# Patient Record
Sex: MALE | Race: OTHER | HISPANIC OR LATINO | Employment: OTHER | URBAN - METROPOLITAN AREA
[De-identification: names, ages, dates, MRNs, and addresses within clinical notes are randomized per-mention and may not be internally consistent; named-entity substitution may affect disease eponyms.]

---

## 2021-03-15 ENCOUNTER — IMMUNIZATIONS (OUTPATIENT)
Dept: FAMILY MEDICINE CLINIC | Facility: HOSPITAL | Age: 81
End: 2021-03-15

## 2021-03-15 DIAGNOSIS — Z23 ENCOUNTER FOR IMMUNIZATION: Primary | ICD-10-CM

## 2021-03-15 PROCEDURE — 91300 SARS-COV-2 / COVID-19 MRNA VACCINE (PFIZER-BIONTECH) 30 MCG: CPT

## 2021-03-15 PROCEDURE — 0001A SARS-COV-2 / COVID-19 MRNA VACCINE (PFIZER-BIONTECH) 30 MCG: CPT

## 2021-03-19 ENCOUNTER — HOSPITAL ENCOUNTER (EMERGENCY)
Facility: HOSPITAL | Age: 81
Discharge: HOME | End: 2021-03-19
Attending: EMERGENCY MEDICINE
Payer: COMMERCIAL

## 2021-03-19 VITALS
RESPIRATION RATE: 20 BRPM | WEIGHT: 130 LBS | HEART RATE: 60 BPM | HEIGHT: 68 IN | TEMPERATURE: 97.7 F | DIASTOLIC BLOOD PRESSURE: 65 MMHG | SYSTOLIC BLOOD PRESSURE: 170 MMHG | BODY MASS INDEX: 19.7 KG/M2 | OXYGEN SATURATION: 99 %

## 2021-03-19 DIAGNOSIS — R68.89 CHANGE IN BLOOD PRESSURE: Primary | ICD-10-CM

## 2021-03-19 LAB
ALBUMIN SERPL BCP-MCNC: 3.7 G/DL (ref 3.5–5)
ALP SERPL-CCNC: 120 U/L (ref 46–116)
ALT SERPL W P-5'-P-CCNC: 38 U/L (ref 12–78)
ANION GAP SERPL CALCULATED.3IONS-SCNC: 8 MMOL/L (ref 4–13)
AST SERPL W P-5'-P-CCNC: 25 U/L (ref 5–45)
ATRIAL RATE: 59 BPM
BASOPHILS # BLD AUTO: 0.03 THOUSANDS/ΜL (ref 0–0.1)
BASOPHILS NFR BLD AUTO: 1 % (ref 0–1)
BILIRUB SERPL-MCNC: 0.28 MG/DL (ref 0.2–1)
BUN SERPL-MCNC: 26 MG/DL (ref 5–25)
CALCIUM SERPL-MCNC: 9.2 MG/DL (ref 8.3–10.1)
CHLORIDE SERPL-SCNC: 102 MMOL/L (ref 100–108)
CHOLEST SERPL-MCNC: 211 MG/DL (ref 50–200)
CO2 SERPL-SCNC: 32 MMOL/L (ref 21–32)
CREAT SERPL-MCNC: 0.86 MG/DL (ref 0.6–1.3)
EOSINOPHIL # BLD AUTO: 0.16 THOUSAND/ΜL (ref 0–0.61)
EOSINOPHIL NFR BLD AUTO: 3 % (ref 0–6)
ERYTHROCYTE [DISTWIDTH] IN BLOOD BY AUTOMATED COUNT: 12.6 % (ref 11.6–15.1)
GFR SERPL CREATININE-BSD FRML MDRD: 82 ML/MIN/1.73SQ M
GLUCOSE SERPL-MCNC: 97 MG/DL (ref 65–140)
HCT VFR BLD AUTO: 41.9 % (ref 36.5–49.3)
HDLC SERPL-MCNC: 68 MG/DL
HGB BLD-MCNC: 13.9 G/DL (ref 12–17)
IMM GRANULOCYTES # BLD AUTO: 0.06 THOUSAND/UL (ref 0–0.2)
IMM GRANULOCYTES NFR BLD AUTO: 1 % (ref 0–2)
LDLC SERPL CALC-MCNC: 135 MG/DL (ref 0–100)
LYMPHOCYTES # BLD AUTO: 0.95 THOUSANDS/ΜL (ref 0.6–4.47)
LYMPHOCYTES NFR BLD AUTO: 16 % (ref 14–44)
MCH RBC QN AUTO: 31.8 PG (ref 26.8–34.3)
MCHC RBC AUTO-ENTMCNC: 33.2 G/DL (ref 31.4–37.4)
MCV RBC AUTO: 96 FL (ref 82–98)
MONOCYTES # BLD AUTO: 0.47 THOUSAND/ΜL (ref 0.17–1.22)
MONOCYTES NFR BLD AUTO: 8 % (ref 4–12)
NEUTROPHILS # BLD AUTO: 4.45 THOUSANDS/ΜL (ref 1.85–7.62)
NEUTS SEG NFR BLD AUTO: 71 % (ref 43–75)
NONHDLC SERPL-MCNC: 143 MG/DL
NRBC BLD AUTO-RTO: 0 /100 WBCS
P AXIS: 85 DEGREES
PLATELET # BLD AUTO: 157 THOUSANDS/UL (ref 149–390)
PMV BLD AUTO: 10 FL (ref 8.9–12.7)
POTASSIUM SERPL-SCNC: 3.6 MMOL/L (ref 3.5–5.3)
PR INTERVAL: 158 MS
PROT SERPL-MCNC: 7.7 G/DL (ref 6.4–8.2)
QRS AXIS: 84 DEGREES
QRSD INTERVAL: 102 MS
QT INTERVAL: 418 MS
QTC INTERVAL: 413 MS
RBC # BLD AUTO: 4.37 MILLION/UL (ref 3.88–5.62)
SODIUM SERPL-SCNC: 142 MMOL/L (ref 136–145)
T WAVE AXIS: 66 DEGREES
TRIGL SERPL-MCNC: 39 MG/DL
VENTRICULAR RATE: 59 BPM
WBC # BLD AUTO: 6.12 THOUSAND/UL (ref 4.31–10.16)

## 2021-03-19 PROCEDURE — 93005 ELECTROCARDIOGRAM TRACING: CPT

## 2021-03-19 PROCEDURE — 93010 ELECTROCARDIOGRAM REPORT: CPT | Performed by: INTERNAL MEDICINE

## 2021-03-19 PROCEDURE — 85025 COMPLETE CBC W/AUTO DIFF WBC: CPT | Performed by: NURSE PRACTITIONER

## 2021-03-19 PROCEDURE — 80061 LIPID PANEL: CPT | Performed by: NURSE PRACTITIONER

## 2021-03-19 PROCEDURE — 99283 EMERGENCY DEPT VISIT LOW MDM: CPT

## 2021-03-19 PROCEDURE — 99282 EMERGENCY DEPT VISIT SF MDM: CPT | Performed by: NURSE PRACTITIONER

## 2021-03-19 PROCEDURE — 36415 COLL VENOUS BLD VENIPUNCTURE: CPT | Performed by: NURSE PRACTITIONER

## 2021-03-19 PROCEDURE — 80053 COMPREHEN METABOLIC PANEL: CPT | Performed by: NURSE PRACTITIONER

## 2021-03-20 NOTE — ED PROVIDER NOTES
History  Chief Complaint   Patient presents with    Medical Problem     Patient "monitoring blood pressure at home and noticed high blood pressure at home, has no compliants, does not take medication for bp"     This is a 71-year-old male patient brought to the department by his daughter was been checking his blood pressure intermittently for the last several days since he has had his COVID vaccine  She has noticed fluctuations in his blood pressure from blood pressures as low as 468 systolic to as high as 332 systolically  Patient offers no symptoms but the patient's daughter is concerned that he may need blood pressure medication  She made several phone calls and everyone told her to come to the emergency room  He is 79 and is not on any medications  [facility] but he offers no complaints  Medical Problem  Associated symptoms: no abdominal pain, no chest pain, no congestion, no cough, no diarrhea, no ear pain, no fatigue, no fever, no headaches, no rash, no shortness of breath, no sore throat, no vomiting and no wheezing        None       No past medical history on file  Past Surgical History:   Procedure Laterality Date    HIP SURGERY         No family history on file  I have reviewed and agree with the history as documented  E-Cigarette/Vaping    E-Cigarette Use Never User      E-Cigarette/Vaping Substances     Social History     Tobacco Use    Smoking status: Former Smoker   Substance Use Topics    Alcohol use: Never     Frequency: Never    Drug use: Never       Review of Systems   Constitutional: Negative for diaphoresis, fatigue and fever  HENT: Negative for congestion, ear pain, nosebleeds and sore throat  Eyes: Negative for photophobia, pain, discharge and visual disturbance  Respiratory: Negative for cough, choking, chest tightness, shortness of breath and wheezing  Cardiovascular: Negative for chest pain and palpitations  Gastrointestinal: Negative for abdominal distention, abdominal pain, diarrhea and vomiting  Genitourinary: Negative for dysuria, flank pain and frequency  Musculoskeletal: Negative for back pain, gait problem and joint swelling  Skin: Negative for color change and rash  Neurological: Negative for dizziness, syncope and headaches  Psychiatric/Behavioral: Negative for behavioral problems and confusion  The patient is not nervous/anxious  All other systems reviewed and are negative  Physical Exam  Physical Exam  Vitals signs reviewed  Constitutional:       Appearance: He is well-developed  He is not diaphoretic  HENT:      Head: Normocephalic and atraumatic  Eyes:      Pupils: Pupils are equal, round, and reactive to light  Neck:      Musculoskeletal: Normal range of motion and neck supple  Cardiovascular:      Rate and Rhythm: Normal rate and regular rhythm  Chest Wall: PMI is not displaced  Pulses: Normal pulses  Heart sounds: Normal heart sounds  Pulmonary:      Effort: Pulmonary effort is normal  No respiratory distress  Breath sounds: Normal breath sounds  Abdominal:      General: There is no distension  Palpations: Abdomen is soft  Tenderness: There is no guarding  Musculoskeletal: Normal range of motion  Lymphadenopathy:      Cervical: No cervical adenopathy  Skin:     General: Skin is warm and dry  Coloration: Skin is not pale  Findings: No rash  Neurological:      Mental Status: He is alert and oriented to person, place, and time           Vital Signs  ED Triage Vitals   Temperature Pulse Respirations Blood Pressure SpO2   03/19/21 1842 03/19/21 1842 03/19/21 1842 03/19/21 1842 03/19/21 1842   97 7 °F (36 5 °C) 55 18 156/68 99 %      Temp Source Heart Rate Source Patient Position - Orthostatic VS BP Location FiO2 (%)   03/19/21 1842 03/19/21 1842 03/19/21 2131 03/19/21 1842 --   Oral Monitor Sitting Left arm       Pain Score --                  Vitals:    03/19/21 1842 03/19/21 2131   BP: 156/68 170/65   Pulse: 55 60   Patient Position - Orthostatic VS:  Sitting         Visual Acuity      ED Medications  Medications - No data to display    Diagnostic Studies  Results Reviewed     Procedure Component Value Units Date/Time    Lipid panel [[id number]]  (Abnormal) Collected: 03/19/21 2056    Lab Status: Final result Specimen: Blood from Arm, Left Updated: 03/19/21 2115     Cholesterol 211 mg/dL      Triglycerides 39 mg/dL      HDL, Direct 68 mg/dL      LDL Calculated 135 mg/dL      Non-HDL-Chol (CHOL-HDL) 143 mg/dl     Comprehensive metabolic panel [[id number]]  (Abnormal) Collected: 03/19/21 2056    Lab Status: Final result Specimen: Blood from Arm, Left Updated: 03/19/21 2114     Sodium 142 mmol/L      Potassium 3 6 mmol/L      Chloride 102 mmol/L      CO2 32 mmol/L      ANION GAP 8 mmol/L      BUN 26 mg/dL      Creatinine 0 86 mg/dL      Glucose 97 mg/dL      Calcium 9 2 mg/dL      AST 25 U/L      ALT 38 U/L      Alkaline Phosphatase 120 U/L      Total Protein 7 7 g/dL      Albumin 3 7 g/dL      Total Bilirubin 0 28 mg/dL      eGFR 82 ml/min/1 73sq m     Narrative:      [place] guidelines for Chronic Kidney Disease (CKD):     Stage 1 with normal or high GFR (GFR > 90 mL/min/1 73 square meters)    Stage 2 Mild CKD (GFR = 60-89 mL/min/1 73 square meters)    Stage 3A Moderate CKD (GFR = 45-59 mL/min/1 73 square meters)    Stage 3B Moderate CKD (GFR = 30-44 mL/min/1 73 square meters)    Stage 4 Severe CKD (GFR = 15-29 mL/min/1 73 square meters)    Stage 5 End Stage CKD (GFR <15 mL/min/1 73 square meters)  Note: GFR calculation is accurate only with a steady state creatinine    CBC and differential [[id number]] Collected: 03/19/21 2056    Lab Status: Final result Specimen: Blood from Arm, Left Updated: 03/19/21 2108     WBC 6 12 Thousand/uL      RBC 4 37 Million/uL      Hemoglobin 13 9 g/dL      Hematocrit 41 9 %      MCV 96 fL      MCH 31 8 pg      MCHC 33 2 g/dL      RDW 12 6 %      MPV 10 0 fL      Platelets 656 Thousands/uL      nRBC 0 /100 WBCs      Neutrophils Relative 71 %      Immat GRANS % 1 %      Lymphocytes Relative 16 %      Monocytes Relative 8 %      Eosinophils Relative 3 %      Basophils Relative 1 %      Neutrophils Absolute 4 45 Thousands/µL      Immature Grans Absolute 0 06 Thousand/uL      Lymphocytes Absolute 0 95 Thousands/µL      Monocytes Absolute 0 47 Thousand/µL      Eosinophils Absolute 0 16 Thousand/µL      Basophils Absolute 0 03 Thousands/µL                  No orders to display              Procedures  Procedures         ED Course                             SBIRT 20yo+      Most Recent Value   SBIRT (24 yo +)   In order to provide better care to our patients, we are screening all of our patients for alcohol and drug use  Would it be okay to ask you these screening questions? Yes Filed at: 03/19/2021 2132   Initial Alcohol Screen: US AUDIT-C    1  How often do you have a drink containing alcohol?  0 Filed at: 03/19/2021 2132   2  How many drinks containing alcohol do you have on a typical day you are drinking? 0 Filed at: 03/19/2021 2132   3a  Male UNDER 65: How often do you have five or more drinks on one occasion? 0 Filed at: 03/19/2021 2132   3b  FEMALE Any Age, or MALE 65+: How often do you have 4 or more drinks on one occassion? 0 Filed at: 03/19/2021 2132   Audit-C Score  0 Filed at: 03/19/2021 2132   SHAWANDA: How many times in the past year have you    Used an illegal drug or used a prescription medication for non-medical reasons?   Never Filed at: 03/19/2021 2132                    MDM      Disposition  Final diagnoses:   Change in blood pressure     Time reflects when diagnosis was documented in both MDM as applicable and the Disposition within this note     Time User Action Codes Description Comment    3/19/2021  9:18 PM Israel Ro [R68 [id number]] Change in blood pressure ED Disposition     ED Disposition Condition Date/Time Comment    Discharge Stable Fri Mar 19, 2021  9:18 PM Jacinto Teresa discharge to home/self care  Follow-up Information     Follow up With Specialties Details Why Contact Info Additional [address] [facility] Schedule an appointment as soon as possible for a visit  To establish a primary care provider [facility] [phone]  [address], [address] [facility], [place], [place], [address]          There are no discharge medications for this patient  No discharge procedures on file      PDMP Review     None          ED Provider  Electronically Signed by           PACO Gamboa  03/19/21 7471

## 2021-04-07 ENCOUNTER — IMMUNIZATIONS (OUTPATIENT)
Dept: FAMILY MEDICINE CLINIC | Facility: HOSPITAL | Age: 81
End: 2021-04-07

## 2021-04-07 DIAGNOSIS — Z23 ENCOUNTER FOR IMMUNIZATION: Primary | ICD-10-CM

## 2021-04-07 PROCEDURE — 0002A SARS-COV-2 / COVID-19 MRNA VACCINE (PFIZER-BIONTECH) 30 MCG: CPT

## 2021-04-07 PROCEDURE — 91300 SARS-COV-2 / COVID-19 MRNA VACCINE (PFIZER-BIONTECH) 30 MCG: CPT

## 2021-04-16 ENCOUNTER — OFFICE VISIT (OUTPATIENT)
Dept: CARDIOLOGY CLINIC | Facility: CLINIC | Age: 81
End: 2021-04-16
Payer: COMMERCIAL

## 2021-04-16 VITALS
SYSTOLIC BLOOD PRESSURE: 142 MMHG | DIASTOLIC BLOOD PRESSURE: 68 MMHG | BODY MASS INDEX: 19.7 KG/M2 | WEIGHT: 130 LBS | HEIGHT: 68 IN | HEART RATE: 56 BPM

## 2021-04-16 DIAGNOSIS — I10 HYPERTENSION, UNSPECIFIED TYPE: Primary | ICD-10-CM

## 2021-04-16 PROCEDURE — 1160F RVW MEDS BY RX/DR IN RCRD: CPT | Performed by: PHYSICIAN ASSISTANT

## 2021-04-16 PROCEDURE — 99204 OFFICE O/P NEW MOD 45 MIN: CPT | Performed by: PHYSICIAN ASSISTANT

## 2021-04-16 RX ORDER — LISINOPRIL 2.5 MG/1
2.5 TABLET ORAL DAILY
Qty: 30 TABLET | Refills: 3 | Status: SHIPPED | OUTPATIENT
Start: 2021-04-16 | End: 2021-07-12

## 2021-04-16 NOTE — PROGRESS NOTES
[facility]   Outpatient Note  Ras Awan  1940  [id number]  [facility] [address]  [facility]    Ras Awan is a 79 y o  male    Assessment and Plan:   Hypertension  Symptomatic labile HTN   Will start on a very small dose of Lisinopril to help control BP   Will also check BMP to be sure potassium and renal function are not affected by ACE-I        Additional Plan:   Medications as detailed above  Pertinent testing orders as outlined  Available lab and test results are reviewed with the patient and any additional required labs are ordered as noted  Return visit will be in one month or earlier if there are problems  The patient is encouraged to call in the meantime if there are questions or concerns  Subjective: The patient is seen in the office as a new patient accompanied by his daughter, who is also his   She tells me that he has been having episodes fatigue and pressure in the back of the head for the past few weeks  When he takes his BP  It is usus ally elevated  There are times when he has an upset stomach associated and his heart rate is low  BP has been as high as 170/80 and as low as 120/60 and HR has been in the 60s  He is otherwise asymptomatic  From a cardiac perspective, he is without complaints of chest pain or exertional dyspnea  He has no palpitations syncope or near syncope, and denies edema orthopnea or PND  He does not complain of TIA or CVA symptoms  He denies and exertional neck, jaw, arm or back pain  Hypertension  Associated symptoms include headaches and malaise/fatigue  Pertinent negatives include no chest pain, orthopnea, palpitations, PND or shortness of breath         Social History  Social History     Tobacco Use   Smoking Status Former Smoker   Smokeless Tobacco Never Used   ,   Social History     Substance and Sexual Activity   Alcohol Use Never    Frequency: Never   ,   Social History     Substance and Sexual Activity   Drug Use Never     History reviewed  No pertinent family history  Medical and Surgical History  History reviewed  No pertinent past medical history  Past Surgical History:   Procedure Laterality Date    HIP SURGERY           Current Outpatient Medications:     lisinopril (ZESTRIL) 2 5 mg tablet, Take 1 tablet (2 5 mg total) by mouth daily, Disp: 30 tablet, Rfl: 3  No Known Allergies    Review of Systems   Constitution: Positive for malaise/fatigue  HENT: Negative  Eyes: Negative  Cardiovascular: Negative  Negative for chest pain, claudication, cyanosis, dyspnea on exertion, irregular heartbeat, leg swelling, near-syncope, orthopnea, palpitations, paroxysmal nocturnal dyspnea and syncope  Respiratory: Negative  Negative for cough, hemoptysis, shortness of breath, sleep disturbances due to breathing, snoring, sputum production and wheezing  Endocrine: Negative  Hematologic/Lymphatic: Negative  Skin: Negative  Musculoskeletal: Negative  Gastrointestinal: Negative  Genitourinary: Negative  Neurological: Positive for headaches  Psychiatric/Behavioral: Negative  Allergic/Immunologic: Negative  Objective:   /68 Comment: small cuff  Pulse 56   Ht 5' 8" (1 727 m)   Wt 59 kg (130 lb)   BMI 19 77 kg/m²   Physical Exam   Constitutional: He is oriented to person, place, and time  He appears well-developed and well-nourished  HENT:   Head: Normocephalic and atraumatic  Mouth/Throat: Oropharynx is clear and moist    Eyes: Conjunctivae are normal  No scleral icterus  Neck: Normal range of motion  Neck supple  No JVD present  No thyromegaly present  Cardiovascular: Normal rate, regular rhythm, S1 normal, S2 normal and normal heart sounds  Exam reveals no gallop and no friction rub  No murmur heard  Pulmonary/Chest: No respiratory distress   He has no wheezes  He has no rales  Abdominal: Soft  Bowel sounds are normal  He exhibits no distension  There is no abdominal tenderness  Aorta not palpable   Musculoskeletal: Normal range of motion  General: No tenderness, deformity or edema  Neurological: He is alert and oriented to person, place, and time  Skin: Skin is warm and dry  Psychiatric: He has a normal mood and affect  His behavior is normal    Nursing note and vitals reviewed        Lab Review:   No results found for: CHOL  Lab Results   Component Value Date    HDL 68 03/19/2021     Lab Results   Component Value Date    LDLCALC 135 (H) 03/19/2021     Lab Results   Component Value Date    TRIG 39 03/19/2021     Results Reviewed     None        Results Reviewed     None        Results Reviewed     None          Recent Cardiovascular Testing:   No tests     ECG Review:   3-19-21: Sinus bradycardia  Incomplete right bundle branch block

## 2021-04-16 NOTE — ASSESSMENT & PLAN NOTE
Symptomatic labile HTN   Will start on a very small dose of Lisinopril to help control BP   Will also check BMP to be sure potassium and renal function are not affected by ACE-I

## 2021-04-16 NOTE — PATIENT INSTRUCTIONS
Lisinopril 2 5 mg daily     Blood work ten days after starting the medication    Return in one month

## 2021-05-01 ENCOUNTER — APPOINTMENT (OUTPATIENT)
Dept: LAB | Facility: HOSPITAL | Age: 81
End: 2021-05-01
Payer: COMMERCIAL

## 2021-05-01 LAB
ANION GAP SERPL CALCULATED.3IONS-SCNC: 6 MMOL/L (ref 4–13)
BUN SERPL-MCNC: 26 MG/DL (ref 5–25)
CALCIUM SERPL-MCNC: 9.2 MG/DL (ref 8.3–10.1)
CHLORIDE SERPL-SCNC: 102 MMOL/L (ref 100–108)
CO2 SERPL-SCNC: 32 MMOL/L (ref 21–32)
CREAT SERPL-MCNC: 0.83 MG/DL (ref 0.6–1.3)
GFR SERPL CREATININE-BSD FRML MDRD: 84 ML/MIN/1.73SQ M
GLUCOSE P FAST SERPL-MCNC: 98 MG/DL (ref 65–99)
POTASSIUM SERPL-SCNC: 4.2 MMOL/L (ref 3.5–5.3)
SODIUM SERPL-SCNC: 140 MMOL/L (ref 136–145)

## 2021-05-01 PROCEDURE — 36415 COLL VENOUS BLD VENIPUNCTURE: CPT | Performed by: PHYSICIAN ASSISTANT

## 2021-05-01 PROCEDURE — 80048 BASIC METABOLIC PNL TOTAL CA: CPT | Performed by: PHYSICIAN ASSISTANT

## 2021-05-25 ENCOUNTER — OFFICE VISIT (OUTPATIENT)
Dept: CARDIOLOGY CLINIC | Facility: CLINIC | Age: 81
End: 2021-05-25
Payer: COMMERCIAL

## 2021-05-25 ENCOUNTER — PROCEDURE VISIT (OUTPATIENT)
Dept: CARDIOLOGY CLINIC | Facility: CLINIC | Age: 81
End: 2021-05-25
Payer: COMMERCIAL

## 2021-05-25 VITALS — DIASTOLIC BLOOD PRESSURE: 58 MMHG | SYSTOLIC BLOOD PRESSURE: 128 MMHG | HEART RATE: 60 BPM

## 2021-05-25 DIAGNOSIS — R00.1 BRADYCARDIA: ICD-10-CM

## 2021-05-25 DIAGNOSIS — R55 PRE-SYNCOPE: Primary | ICD-10-CM

## 2021-05-25 DIAGNOSIS — R55 PRE-SYNCOPE: ICD-10-CM

## 2021-05-25 DIAGNOSIS — I10 HYPERTENSION, UNSPECIFIED TYPE: ICD-10-CM

## 2021-05-25 PROCEDURE — 93000 ELECTROCARDIOGRAM COMPLETE: CPT | Performed by: PHYSICIAN ASSISTANT

## 2021-05-25 PROCEDURE — 3078F DIAST BP <80 MM HG: CPT | Performed by: PHYSICIAN ASSISTANT

## 2021-05-25 PROCEDURE — 93246 EXT ECG>7D<15D RECORDING: CPT | Performed by: INTERNAL MEDICINE

## 2021-05-25 PROCEDURE — 99214 OFFICE O/P EST MOD 30 MIN: CPT | Performed by: PHYSICIAN ASSISTANT

## 2021-05-25 PROCEDURE — 1160F RVW MEDS BY RX/DR IN RCRD: CPT | Performed by: PHYSICIAN ASSISTANT

## 2021-05-25 PROCEDURE — 3074F SYST BP LT 130 MM HG: CPT | Performed by: PHYSICIAN ASSISTANT

## 2021-05-25 NOTE — PROGRESS NOTES
[facility]   Outpatient Note  Yony Romero  1940  [id number]  [facility], [facility] [address]  [facility] [address]    Yony Romero is a 79 y o  male    Assessment and Plan:   Bradycardia  Patient has symptoms of near syncope that he thought was due to low BP, but his record shows that his BP has not been low enough to cause syncope  I see that his heart rate has been bradycardic and is bradycardic in the office today    A two-week holter will be obtained to be sure there is no concerning bradyarrhythmia or pauses present  As well and echo will be checked to assess LV function and look for WMA abnormalities  Hypertension  Controlled on current medical regimen  Continue Lisinopril   GFR stable     Pre-syncope  See additional comments         Additional Plan:   Medications as detailed above  Pertinent testing orders as outlined  Available lab and test results are reviewed with the patient and any additional required labs are ordered as noted  Return visit will be in one month or earlier if there are problems  The patient is encouraged to call in the meantime if there are questions or concerns  Subjective: The patient is seen in the office today accompanied by his daughter, who is also his   She tells me that he has been having episodes fatigue and near syncope  They have been keeping track of his BP, and it has been controlled;  but what I have noticed on their records is that his HR is in the 40s and 50s  He is otherwise asymptomatic  From a cardiac perspective, he is without complaints of chest pain or exertional dyspnea  He has no palpitations syncope or near syncope, and denies edema orthopnea or PND  He does not complain of TIA or CVA symptoms  He denies and exertional neck, jaw, arm or back pain         Hypertension  Associated symptoms include malaise/fatigue  Pertinent negatives include no chest pain, headaches, orthopnea, palpitations, PND or shortness of breath  Social History  Social History     Tobacco Use   Smoking Status Former Smoker   Smokeless Tobacco Never Used   ,   Social History     Substance and Sexual Activity   Alcohol Use Never    Frequency: Never   ,   Social History     Substance and Sexual Activity   Drug Use Never     History reviewed  No pertinent family history  Medical and Surgical History  Past Medical History:   Diagnosis Date    Hypertension      Past Surgical History:   Procedure Laterality Date    HIP SURGERY           Current Outpatient Medications:     lisinopril (ZESTRIL) 2 5 mg tablet, Take 1 tablet (2 5 mg total) by mouth daily, Disp: 30 tablet, Rfl: 3  No Known Allergies    Review of Systems   Constitution: Positive for malaise/fatigue  HENT: Negative  Eyes: Negative  Cardiovascular: Positive for near-syncope  Negative for chest pain, claudication, cyanosis, dyspnea on exertion, irregular heartbeat, leg swelling, orthopnea, palpitations, paroxysmal nocturnal dyspnea and syncope  Respiratory: Negative  Negative for cough, hemoptysis, shortness of breath, sleep disturbances due to breathing, snoring, sputum production and wheezing  Endocrine: Negative  Hematologic/Lymphatic: Negative  Skin: Negative  Musculoskeletal: Negative  Gastrointestinal: Negative  Genitourinary: Negative  Neurological: Negative for headaches  Psychiatric/Behavioral: Negative  Allergic/Immunologic: Negative  Objective:   /58   Pulse 60   Physical Exam   Constitutional: He is oriented to person, place, and time  He appears well-developed and well-nourished  HENT:   Head: Normocephalic and atraumatic  Mouth/Throat: Oropharynx is clear and moist    Eyes: Conjunctivae are normal  No scleral icterus  Neck: Normal range of motion  Neck supple  No JVD present  No thyromegaly present  Cardiovascular: Normal rate, regular rhythm, S1 normal, S2 normal and normal heart sounds  Exam reveals no gallop and no friction rub  No murmur heard  Pulmonary/Chest: No respiratory distress  He has no wheezes  He has no rales  Abdominal: Soft  Bowel sounds are normal  He exhibits no distension  There is no abdominal tenderness  Aorta not palpable   Musculoskeletal: Normal range of motion  General: No tenderness, deformity or edema  Comments: Uses a walker at home    Neurological: He is alert and oriented to person, place, and time  Skin: Skin is warm and dry  Psychiatric: He has a normal mood and affect  His behavior is normal    Nursing note and vitals reviewed        Lab Review:   No results found for: CHOL  Lab Results   Component Value Date    HDL 68 03/19/2021     Lab Results   Component Value Date    LDLCALC 135 (H) 03/19/2021     Lab Results   Component Value Date    TRIG 39 03/19/2021     Results Reviewed     None        Results Reviewed     None        Results Reviewed     None          Recent Cardiovascular Testing:   Echo and holter ordered     ECG Review:   5-25-21: Sinus bradycardia  [id number]  NS ST Changes     3-19-21: Sinus bradycardia  Incomplete right bundle branch block

## 2021-05-25 NOTE — ASSESSMENT & PLAN NOTE
Patient has symptoms of near syncope that he thought was due to low BP, but his record shows that his BP has not been low enough to cause syncope  I see that his heart rate has been bradycardic and is bradycardic in the office today    A two-week holter will be obtained to be sure there is no concerning bradyarrhythmia or pauses present  As well and echo will be checked to assess LV function and look for WMA abnormalities

## 2021-05-25 NOTE — PATIENT INSTRUCTIONS
Monitor for two weeks     Echo     and then return in one month     Continue medication as prescribed

## 2021-06-01 ENCOUNTER — PROCEDURE VISIT (OUTPATIENT)
Dept: CARDIOLOGY CLINIC | Facility: CLINIC | Age: 81
End: 2021-06-01

## 2021-06-01 DIAGNOSIS — R00.1 BRADYCARDIA: ICD-10-CM

## 2021-06-01 PROCEDURE — RECHECK: Performed by: PHYSICIAN ASSISTANT

## 2021-07-06 ENCOUNTER — CLINICAL SUPPORT (OUTPATIENT)
Dept: CARDIOLOGY CLINIC | Facility: CLINIC | Age: 81
End: 2021-07-06
Payer: COMMERCIAL

## 2021-07-06 DIAGNOSIS — R55 PRE-SYNCOPE: ICD-10-CM

## 2021-07-06 DIAGNOSIS — R00.1 BRADYCARDIA: ICD-10-CM

## 2021-07-06 PROCEDURE — 93248 EXT ECG>7D<15D REV&INTERPJ: CPT | Performed by: INTERNAL MEDICINE

## 2021-07-11 DIAGNOSIS — I10 HYPERTENSION, UNSPECIFIED TYPE: ICD-10-CM

## 2021-07-12 RX ORDER — LISINOPRIL 2.5 MG/1
TABLET ORAL
Qty: 30 TABLET | Refills: 3 | Status: SHIPPED | OUTPATIENT
Start: 2021-07-12 | End: 2021-10-20

## 2021-07-12 NOTE — RESULT ENCOUNTER NOTE
Basic mechanism was sinus  When in sinus rhythm the minimum heart rate was 45 and a maximum heart rate was 99 beats per minute  The average heart rate was 60 do beats per minute  There was a 5 beat ventricular run with an average rate of 172 beats per minute  Ventricular ectopy was otherwise uncommon  There were also short supraventricular runs the longest lasting 13 seconds  No pauses were present       Sarah Phoenix MD

## 2021-10-20 DIAGNOSIS — I10 HYPERTENSION, UNSPECIFIED TYPE: ICD-10-CM

## 2021-10-20 RX ORDER — LISINOPRIL 2.5 MG/1
TABLET ORAL
Qty: 90 TABLET | Refills: 1 | Status: SHIPPED | OUTPATIENT
Start: 2021-10-20 | End: 2022-05-25 | Stop reason: SDUPTHER

## 2022-05-25 DIAGNOSIS — I10 HYPERTENSION, UNSPECIFIED TYPE: ICD-10-CM

## 2022-05-25 RX ORDER — LISINOPRIL 2.5 MG/1
2.5 TABLET ORAL DAILY
Qty: 90 TABLET | Refills: 0 | Status: SHIPPED | OUTPATIENT
Start: 2022-05-25 | End: 2022-07-12

## 2022-05-25 NOTE — TELEPHONE ENCOUNTER
Patient has not been seen in the office for 1 year, spoke to patients daughter to make apt and I will refill lisinopril for enough till patient comes to office visit  Educated them that this is protocol now

## 2022-07-12 ENCOUNTER — OFFICE VISIT (OUTPATIENT)
Dept: CARDIOLOGY CLINIC | Facility: CLINIC | Age: 82
End: 2022-07-12
Payer: COMMERCIAL

## 2022-07-12 VITALS
BODY MASS INDEX: 16.67 KG/M2 | WEIGHT: 110 LBS | SYSTOLIC BLOOD PRESSURE: 100 MMHG | HEIGHT: 68 IN | DIASTOLIC BLOOD PRESSURE: 48 MMHG | HEART RATE: 61 BPM

## 2022-07-12 DIAGNOSIS — R00.1 BRADYCARDIA: Primary | ICD-10-CM

## 2022-07-12 PROCEDURE — 93000 ELECTROCARDIOGRAM COMPLETE: CPT | Performed by: PHYSICIAN ASSISTANT

## 2022-07-12 PROCEDURE — 99213 OFFICE O/P EST LOW 20 MIN: CPT | Performed by: PHYSICIAN ASSISTANT

## 2022-07-12 NOTE — PROGRESS NOTES
[facility]   Outpatient Note  Ras Awan  1940  [id number]  [facility], [facility] [id number]  Darrius [address]    Ras Awan is a [de-identified] y o  male    Assessment and Plan:   Hypertension  Two well controlled  Will discontinue lisinopril     Since the patient is on no medications from a heart perspective I will be seeing him only on an as-needed basis  Additional Plan:   No Medication changes made or testing ordered today  Available lab results are reviewed with the patient as noted  We will see the patient on an as needed basis only, but would happy to see her at any point in time should it be deemed it necessary in the future  Subjective: The patient is seen in the office today accompanied by his daughter, who is also his   She tells me that he has been having episodes fatigue and near syncope  He is on lisinopril for blood pressure control and his blood pressure is low today  He is otherwise asymptomatic  From a cardiac perspective, he is without complaints of chest pain or exertional dyspnea  He has no palpitations syncope or near syncope, and denies edema orthopnea or PND  He does not complain of TIA or CVA symptoms  He denies and exertional neck, jaw, arm or back pain  Social History  Social History     Tobacco Use   Smoking Status Former Smoker   Smokeless Tobacco Never Used   ,   Social History     Substance and Sexual Activity   Alcohol Use Never   ,   Social History     Substance and Sexual Activity   Drug Use Never     History reviewed  No pertinent family history  Medical and Surgical History  Past Medical History:   Diagnosis Date    Hypertension      Past Surgical History:   Procedure Laterality Date    HIP SURGERY         No current outpatient medications on file    No Known Allergies    Review of Systems   Constitutional: Positive for malaise/fatigue  HENT: Negative  Eyes: Negative  Cardiovascular: Positive for near-syncope  Negative for chest pain, claudication, cyanosis, dyspnea on exertion, irregular heartbeat, leg swelling, orthopnea, palpitations, paroxysmal nocturnal dyspnea and syncope  Respiratory: Negative  Negative for cough, hemoptysis, shortness of breath, sleep disturbances due to breathing, snoring, sputum production and wheezing  Endocrine: Negative  Hematologic/Lymphatic: Negative  Skin: Negative  Musculoskeletal: Negative  Gastrointestinal: Negative  Genitourinary: Negative  Neurological: Negative for headaches  Psychiatric/Behavioral: Negative  Allergic/Immunologic: Negative  Objective:   BP (!) 100/48   Pulse 61   Ht 5' 8" (1 727 m)   Wt 49 9 kg (110 lb)   BMI 16 73 kg/m²   Physical Exam  Vitals and nursing note reviewed  Constitutional:       Appearance: He is well-developed  HENT:      Head: Normocephalic and atraumatic  Eyes:      General: No scleral icterus  Conjunctiva/sclera: Conjunctivae normal    Neck:      Thyroid: No thyromegaly  Vascular: No JVD  Cardiovascular:      Rate and Rhythm: Normal rate and regular rhythm  Heart sounds: Normal heart sounds, S1 normal and S2 normal  No murmur heard  No friction rub  No gallop  Pulmonary:      Effort: No respiratory distress  Breath sounds: No wheezing or rales  Abdominal:      General: Bowel sounds are normal  There is no distension  Palpations: Abdomen is soft  Tenderness: There is no abdominal tenderness  Comments: Aorta not palpable   Musculoskeletal:         General: No tenderness or deformity  Normal range of motion  Cervical back: Normal range of motion and neck supple  Comments: Uses a walker at home    Skin:     General: Skin is warm and dry  Neurological:      Mental Status: He is alert and oriented to person, place, and time     Psychiatric:         Behavior: Behavior normal          Lab Review:   No results found for: CHOL  Lab Results   Component Value Date    HDL 68 03/19/2021     Lab Results   Component Value Date    LDLCALC 135 (H) 03/19/2021     Lab Results   Component Value Date    TRIG 39 03/19/2021     Results Reviewed     None        Results Reviewed     None        Results Reviewed     None          Recent Cardiovascular Testing:   Echo and holter ordered     ECG Review:   5-25-21: Sinus bradycardia  ICRBBB  NS ST Changes     3-19-21: Sinus bradycardia  Incomplete right bundle branch block

## 2022-10-30 NOTE — PROGRESS NOTES
Patient came in for another Ziopatch to be worn for 14 days  The previous one fell off after a day 
DISCHARGE